# Patient Record
Sex: MALE | Race: OTHER | HISPANIC OR LATINO | Employment: FULL TIME | URBAN - METROPOLITAN AREA
[De-identification: names, ages, dates, MRNs, and addresses within clinical notes are randomized per-mention and may not be internally consistent; named-entity substitution may affect disease eponyms.]

---

## 2019-03-04 ENCOUNTER — HOSPITAL ENCOUNTER (EMERGENCY)
Facility: HOSPITAL | Age: 29
Discharge: HOME | End: 2019-03-04
Attending: EMERGENCY MEDICINE

## 2019-03-04 VITALS
TEMPERATURE: 97.5 F | SYSTOLIC BLOOD PRESSURE: 124 MMHG | WEIGHT: 225.75 LBS | HEIGHT: 73 IN | BODY MASS INDEX: 29.92 KG/M2 | OXYGEN SATURATION: 99 % | HEART RATE: 62 BPM | RESPIRATION RATE: 16 BRPM | DIASTOLIC BLOOD PRESSURE: 72 MMHG

## 2019-03-04 DIAGNOSIS — K21.9 GERD (GASTROESOPHAGEAL REFLUX DISEASE): ICD-10-CM

## 2019-03-04 DIAGNOSIS — R10.9 ABDOMINAL PAIN: Primary | ICD-10-CM

## 2019-03-04 LAB
ALBUMIN SERPL BCP-MCNC: 4 G/DL (ref 3.5–5)
ALP SERPL-CCNC: 51 U/L (ref 46–116)
ALT SERPL W P-5'-P-CCNC: 21 U/L (ref 12–78)
ANION GAP SERPL CALCULATED.3IONS-SCNC: 2 MMOL/L (ref 4–13)
AST SERPL W P-5'-P-CCNC: 13 U/L (ref 5–45)
BASOPHILS # BLD AUTO: 0.06 THOUSANDS/ΜL (ref 0–0.1)
BASOPHILS NFR BLD AUTO: 1 % (ref 0–1)
BILIRUB SERPL-MCNC: 0.37 MG/DL (ref 0.2–1)
BUN SERPL-MCNC: 16 MG/DL (ref 5–25)
CALCIUM SERPL-MCNC: 8.9 MG/DL (ref 8.3–10.1)
CHLORIDE SERPL-SCNC: 105 MMOL/L (ref 100–108)
CO2 SERPL-SCNC: 30 MMOL/L (ref 21–32)
CREAT SERPL-MCNC: 1.09 MG/DL (ref 0.6–1.3)
EOSINOPHIL # BLD AUTO: 0.23 THOUSAND/ΜL (ref 0–0.61)
EOSINOPHIL NFR BLD AUTO: 3 % (ref 0–6)
ERYTHROCYTE [DISTWIDTH] IN BLOOD BY AUTOMATED COUNT: 12.8 % (ref 11.6–15.1)
GFR SERPL CREATININE-BSD FRML MDRD: 92 ML/MIN/1.73SQ M
GLUCOSE SERPL-MCNC: 94 MG/DL (ref 65–140)
HCT VFR BLD AUTO: 48.1 % (ref 36.5–49.3)
HGB BLD-MCNC: 16.1 G/DL (ref 12–17)
IMM GRANULOCYTES # BLD AUTO: 0.02 THOUSAND/UL (ref 0–0.2)
IMM GRANULOCYTES NFR BLD AUTO: 0 % (ref 0–2)
LIPASE SERPL-CCNC: 32 U/L (ref 73–393)
LYMPHOCYTES # BLD AUTO: 2.27 THOUSANDS/ΜL (ref 0.6–4.47)
LYMPHOCYTES NFR BLD AUTO: 26 % (ref 14–44)
MCH RBC QN AUTO: 30.1 PG (ref 26.8–34.3)
MCHC RBC AUTO-ENTMCNC: 33.5 G/DL (ref 31.4–37.4)
MCV RBC AUTO: 90 FL (ref 82–98)
MONOCYTES # BLD AUTO: 0.71 THOUSAND/ΜL (ref 0.17–1.22)
MONOCYTES NFR BLD AUTO: 8 % (ref 4–12)
NEUTROPHILS # BLD AUTO: 5.49 THOUSANDS/ΜL (ref 1.85–7.62)
NEUTS SEG NFR BLD AUTO: 62 % (ref 43–75)
NRBC BLD AUTO-RTO: 0 /100 WBCS
PLATELET # BLD AUTO: 220 THOUSANDS/UL (ref 149–390)
PMV BLD AUTO: 9.9 FL (ref 8.9–12.7)
POTASSIUM SERPL-SCNC: 4.4 MMOL/L (ref 3.5–5.3)
PROT SERPL-MCNC: 7.6 G/DL (ref 6.4–8.2)
RBC # BLD AUTO: 5.35 MILLION/UL (ref 3.88–5.62)
SODIUM SERPL-SCNC: 137 MMOL/L (ref 136–145)
WBC # BLD AUTO: 8.78 THOUSAND/UL (ref 4.31–10.16)

## 2019-03-04 PROCEDURE — 83690 ASSAY OF LIPASE: CPT | Performed by: EMERGENCY MEDICINE

## 2019-03-04 PROCEDURE — 85025 COMPLETE CBC W/AUTO DIFF WBC: CPT | Performed by: EMERGENCY MEDICINE

## 2019-03-04 PROCEDURE — 99284 EMERGENCY DEPT VISIT MOD MDM: CPT

## 2019-03-04 PROCEDURE — 36415 COLL VENOUS BLD VENIPUNCTURE: CPT | Performed by: EMERGENCY MEDICINE

## 2019-03-04 PROCEDURE — 80053 COMPREHEN METABOLIC PANEL: CPT | Performed by: EMERGENCY MEDICINE

## 2019-03-04 RX ORDER — MAGNESIUM HYDROXIDE/ALUMINUM HYDROXICE/SIMETHICONE 120; 1200; 1200 MG/30ML; MG/30ML; MG/30ML
30 SUSPENSION ORAL ONCE
Status: COMPLETED | OUTPATIENT
Start: 2019-03-04 | End: 2019-03-04

## 2019-03-04 RX ADMIN — LIDOCAINE HYDROCHLORIDE 15 ML: 20 SOLUTION ORAL; TOPICAL at 12:28

## 2019-03-04 RX ADMIN — ALUMINUM HYDROXIDE, MAGNESIUM HYDROXIDE, AND SIMETHICONE 30 ML: 200; 200; 20 SUSPENSION ORAL at 12:28

## 2019-03-04 NOTE — ED ATTENDING ATTESTATION
José Soni MD, saw and evaluated the patient  I have discussed the patient with the resident/non-physician practitioner and agree with the resident's/non-physician practitioner's findings, Plan of Care, and MDM as documented in the resident's/non-physician practitioner's note, except where noted  All available labs and Radiology studies were reviewed  I was present for key portions of any procedure(s) performed by the resident/non-physician practitioner and I was immediately available to provide assistance  At this point I agree with the current assessment done in the Emergency Department  I have conducted an independent evaluation of this patient a history and physical is as follows:      24-year-old male presenting to the emergency department for evaluation of upper abdominal pain  Began last night  Associated with food  Recurrent episode today  Positive nausea without vomiting  No diarrhea  No fever  Ten systems reviewed negative except as noted in the history of present illness  The patient is resting comfortably on a stretcher in no acute respiratory distress  The patient appears nontoxic  HEENT reveals moist mucous membranes  Head is normocephalic and atraumatic  Conjunctiva and sclera are normal  Neck is nontender and supple with full range of motion to flexion, extension, lateral rotation  No meningismus appreciated  No masses are appreciated  Lungs are clear to auscultation bilaterally without any wheezes, rales or rhonchi  Heart is regular rate and rhythm without any murmurs, rubs or gallops  Mild tenderness to palpation in the epigastrium in the right upper quadrant  Negative Valdes's sign  Extremities appear grossly normal without any significant arthropathy  Patient is awake, alert, and oriented x3  The patient has normal interaction  Motor is 5 out of 5      plan is lab work including LFTs and lipase    Patient will undergo bedside ultrasound to evaluate his gallbladder  Labs Reviewed   COMPREHENSIVE METABOLIC PANEL - Abnormal       Result Value Ref Range Status    Sodium 137  136 - 145 mmol/L Final    Potassium 4 4  3 5 - 5 3 mmol/L Final    Chloride 105  100 - 108 mmol/L Final    CO2 30  21 - 32 mmol/L Final    ANION GAP 2 (*) 4 - 13 mmol/L Final    BUN 16  5 - 25 mg/dL Final    Creatinine 1 09  0 60 - 1 30 mg/dL Final    Comment: Standardized to IDMS reference method    Glucose 94  65 - 140 mg/dL Final    Comment:   If the patient is fasting, the ADA then defines impaired fasting glucose as > 100 mg/dL and diabetes as > or equal to 123 mg/dL  Specimen collection should occur prior to Sulfasalazine administration due to the potential for falsely depressed results  Specimen collection should occur prior to Sulfapyridine administration due to the potential for falsely elevated results  Calcium 8 9  8 3 - 10 1 mg/dL Final    AST 13  5 - 45 U/L Final    Comment:   Specimen collection should occur prior to Sulfasalazine administration due to the potential for falsely depressed results  ALT 21  12 - 78 U/L Final    Comment:   Specimen collection should occur prior to Sulfasalazine and/or Sulfapyridine administration due to the potential for falsely depressed results  Alkaline Phosphatase 51  46 - 116 U/L Final    Total Protein 7 6  6 4 - 8 2 g/dL Final    Albumin 4 0  3 5 - 5 0 g/dL Final    Total Bilirubin 0 37  0 20 - 1 00 mg/dL Final    eGFR 92  ml/min/1 73sq m Final    Narrative:     National Kidney Disease Education Program recommendations are as follows:  GFR calculation is accurate only with a steady state creatinine  Chronic Kidney disease less than 60 ml/min/1 73 sq  meters  Kidney failure less than 15 ml/min/1 73 sq  meters     LIPASE - Abnormal    Lipase 32 (*) 73 - 393 u/L Final   CBC AND DIFFERENTIAL    WBC 8 78  4 31 - 10 16 Thousand/uL Final    RBC 5 35  3 88 - 5 62 Million/uL Final    Hemoglobin 16 1  12 0 - 17 0 g/dL Final    Hematocrit 48 1 36 5 - 49 3 % Final    MCV 90  82 - 98 fL Final    MCH 30 1  26 8 - 34 3 pg Final    MCHC 33 5  31 4 - 37 4 g/dL Final    RDW 12 8  11 6 - 15 1 % Final    MPV 9 9  8 9 - 12 7 fL Final    Platelets 208  945 - 390 Thousands/uL Final    nRBC 0  /100 WBCs Final    Neutrophils Relative 62  43 - 75 % Final    Immat GRANS % 0  0 - 2 % Final    Lymphocytes Relative 26  14 - 44 % Final    Monocytes Relative 8  4 - 12 % Final    Eosinophils Relative 3  0 - 6 % Final    Basophils Relative 1  0 - 1 % Final    Neutrophils Absolute 5 49  1 85 - 7 62 Thousands/µL Final    Immature Grans Absolute 0 02  0 00 - 0 20 Thousand/uL Final    Lymphocytes Absolute 2 27  0 60 - 4 47 Thousands/µL Final    Monocytes Absolute 0 71  0 17 - 1 22 Thousand/µL Final    Eosinophils Absolute 0 23  0 00 - 0 61 Thousand/µL Final    Basophils Absolute 0 06  0 00 - 0 10 Thousands/µL Final

## 2019-03-04 NOTE — ED NOTES
Pt states he developed epigastric pain after eating fruit  Pt states he does not have pain until after he eats something       Armani Mckeon RN  03/04/19 0027

## 2019-03-04 NOTE — ED PROVIDER NOTES
History  Chief Complaint   Patient presents with    Abdominal Pain     Pt c/o sternal/upper gastric pain that started last night  72-year-old male presenting to the emergency department for evaluation of epigastric and right upper quadrant abdominal pain  Patient states this happened last couple of days after eating states that it resolved after approximately 30 minutes  Denies any associated nausea or vomiting but states it feels as though it is a burning pain that radiates up into his chest   Patient states he has had acid in the past   Denies any associated chest pain or shortness of breath states he has had similar pain like this in the past denies any associated constipation or diarrhea no fevers or chills at home he feels otherwise well  On exam patient does have right upper quadrant abdominal pain is remaining review of systems is negative  History provided by:  Patient   used: No    Abdominal Pain   Pain location:  Epigastric  Pain quality: burning and fullness    Pain radiates to:  Chest  Pain severity:  Mild  Onset quality:  Sudden  Timing:  Constant  Progression:  Unchanged  Chronicity:  New  Context: eating    Context: not diet changes    Relieved by:  Nothing  Worsened by:  Nothing  Ineffective treatments:  None tried  Associated symptoms: nausea    Associated symptoms: no chest pain, no chills, no constipation, no diarrhea, no dysuria, no fatigue, no fever, no hematuria, no shortness of breath, no sore throat and no vomiting        None       History reviewed  No pertinent past medical history  History reviewed  No pertinent surgical history  History reviewed  No pertinent family history  I have reviewed and agree with the history as documented      Social History     Tobacco Use    Smoking status: Never Smoker    Smokeless tobacco: Never Used   Substance Use Topics    Alcohol use: Yes     Comment: social    Drug use: Not Currently        Review of Systems Constitutional: Negative for chills, fatigue and fever  HENT: Negative for sore throat  Eyes: Negative for visual disturbance  Respiratory: Negative for shortness of breath  Cardiovascular: Negative for chest pain  Gastrointestinal: Positive for abdominal pain and nausea  Negative for constipation, diarrhea and vomiting  Genitourinary: Negative for difficulty urinating, dysuria and hematuria  Musculoskeletal: Negative for arthralgias  Skin: Negative for rash  Neurological: Negative for syncope, weakness and headaches  Hematological: Negative for adenopathy  Psychiatric/Behavioral: Negative for agitation and behavioral problems  All other systems reviewed and are negative  Physical Exam  ED Triage Vitals [03/04/19 1056]   Temperature Pulse Respirations Blood Pressure SpO2   97 5 °F (36 4 °C) 71 18 136/72 100 %      Temp Source Heart Rate Source Patient Position - Orthostatic VS BP Location FiO2 (%)   Oral Monitor Sitting Left arm --      Pain Score       7           Orthostatic Vital Signs  Vitals:    03/04/19 1056 03/04/19 1304 03/04/19 1305 03/04/19 1400   BP: 136/72 126/73 126/73 124/72   Pulse: 71  61 62   Patient Position - Orthostatic VS: Sitting          Physical Exam   Constitutional: He is oriented to person, place, and time  He appears well-developed and well-nourished  HENT:   Head: Normocephalic and atraumatic  Eyes: Conjunctivae and EOM are normal  No scleral icterus  Neck: Normal range of motion  Neck supple  Cardiovascular: Normal rate and regular rhythm  No murmur heard  Pulmonary/Chest: Effort normal and breath sounds normal    Abdominal: Soft  Bowel sounds are normal  There is tenderness in the right upper quadrant  Musculoskeletal: Normal range of motion  Neurological: He is alert and oriented to person, place, and time  Skin: Skin is warm and dry  Psychiatric: He has a normal mood and affect   His behavior is normal    Nursing note and vitals reviewed  ED Medications  Medications   lidocaine viscous (XYLOCAINE) 2 % mucosal solution 15 mL (15 mL Swish & Spit Given 3/4/19 1228)   aluminum-magnesium hydroxide-simethicone (MYLANTA) 200-200-20 mg/5 mL oral suspension 30 mL (30 mL Oral Given 3/4/19 1228)       Diagnostic Studies  Results Reviewed     Procedure Component Value Units Date/Time    Comprehensive metabolic panel [[id number]]  (Abnormal) Collected:  03/04/19 1257    Lab Status:  Final result Specimen:  Blood from Arm, Left Updated:  03/04/19 1324     Sodium 137 mmol/L      Potassium 4 4 mmol/L      Chloride 105 mmol/L      CO2 30 mmol/L      ANION GAP 2 mmol/L      BUN 16 mg/dL      Creatinine 1 09 mg/dL      Glucose 94 mg/dL      Calcium 8 9 mg/dL      AST 13 U/L      ALT 21 U/L      Alkaline Phosphatase 51 U/L      Total Protein 7 6 g/dL      Albumin 4 0 g/dL      Total Bilirubin 0 37 mg/dL      eGFR 92 ml/min/1 73sq m     Narrative:       National Kidney Disease Education Program recommendations are as follows:  GFR calculation is accurate only with a steady state creatinine  Chronic Kidney disease less than 60 ml/min/1 73 sq  meters  Kidney failure less than 15 ml/min/1 73 sq  meters      Lipase [[id number]]  (Abnormal) Collected:  03/04/19 1257    Lab Status:  Final result Specimen:  Blood from Arm, Left Updated:  03/04/19 1324     Lipase 32 u/L     CBC and differential [[id number]] Collected:  03/04/19 1257    Lab Status:  Final result Specimen:  Blood from Arm, Left Updated:  03/04/19 1306     WBC 8 78 Thousand/uL      RBC 5 35 Million/uL      Hemoglobin 16 1 g/dL      Hematocrit 48 1 %      MCV 90 fL      MCH 30 1 pg      MCHC 33 5 g/dL      RDW 12 8 %      MPV 9 9 fL      Platelets 507 Thousands/uL      nRBC 0 /100 WBCs      Neutrophils Relative 62 %      Immat GRANS % 0 %      Lymphocytes Relative 26 %      Monocytes Relative 8 %      Eosinophils Relative 3 %      Basophils Relative 1 %      Neutrophils Absolute 5 49 Thousands/µL Immature Grans Absolute 0 02 Thousand/uL      Lymphocytes Absolute 2 27 Thousands/µL      Monocytes Absolute 0 71 Thousand/µL      Eosinophils Absolute 0 23 Thousand/µL      Basophils Absolute 0 06 Thousands/µL                  No orders to display         Procedures  Procedures      Phone Consults  ED Phone Contact    ED Course                               MDM  Number of Diagnoses or Management Options  Abdominal pain: new and requires workup  GERD (gastroesophageal reflux disease): new and requires workup  Diagnosis management comments: 27-year-old male presenting with epigastric abdominal pain on exam he found to have right upper quadrant abdominal pain concerning for possible cholelithiasis, will order abdominal labs  Bedside right upper quadrant ultrasound was unremarkable for any gallbladder pathology, patient's symptoms resolved with GI cocktail, gave him return precautions and information to follow up with a new primary care physician for possible GERD  Patient is agreeable to this plan  Amount and/or Complexity of Data Reviewed  Clinical lab tests: ordered and reviewed  Tests in the medicine section of CPT®: ordered and reviewed  Review and summarize past medical records: yes        Disposition  Final diagnoses:   Abdominal pain   GERD (gastroesophageal reflux disease)     Time reflects when diagnosis was documented in both MDM as applicable and the Disposition within this note     Time User Action Codes Description Comment    3/4/2019  1:52 PM Cris Car Add [R10 9] Abdominal pain     3/4/2019  1:52 PM Cris Car Add [K21 9] GERD (gastroesophageal reflux disease)       ED Disposition     ED Disposition Condition Date/Time Comment    Discharge Stable Mon Mar 4, 2019  1:52 PM Warner Snellen discharge to home/self care              Follow-up Information     Follow up With Specialties Details Why Contact Info Additional Information    [employer]    [phone]       [facility]   [place] [id number], located within [facility] [address]  [address] [place] [address], [address] [place] [address], [facility] [place], [place], [id number]    [address] [place] [address] Emergency Department Emergency Medicine   [address]  [phone]  ED, [address] [place] [address], [phone]          There are no discharge medications for this patient  No discharge procedures on file  ED Provider  Attending physically available and evaluated Yassine Mann I managed the patient along with the ED Attending      Electronically Signed by         Mando Koehler MD  03/05/19 8363